# Patient Record
Sex: MALE | Race: WHITE
[De-identification: names, ages, dates, MRNs, and addresses within clinical notes are randomized per-mention and may not be internally consistent; named-entity substitution may affect disease eponyms.]

---

## 2020-02-14 ENCOUNTER — HOSPITAL ENCOUNTER (EMERGENCY)
Dept: HOSPITAL 63 - ER | Age: 45
Discharge: HOME | End: 2020-02-14
Payer: SELF-PAY

## 2020-02-14 VITALS — WEIGHT: 193.35 LBS | HEIGHT: 72 IN | BODY MASS INDEX: 26.19 KG/M2

## 2020-02-14 VITALS — SYSTOLIC BLOOD PRESSURE: 142 MMHG | DIASTOLIC BLOOD PRESSURE: 80 MMHG

## 2020-02-14 DIAGNOSIS — L03.011: Primary | ICD-10-CM

## 2020-02-14 PROCEDURE — 99284 EMERGENCY DEPT VISIT MOD MDM: CPT

## 2020-02-14 PROCEDURE — 73140 X-RAY EXAM OF FINGER(S): CPT

## 2020-02-14 PROCEDURE — 87070 CULTURE OTHR SPECIMN AEROBIC: CPT

## 2020-02-14 NOTE — RAD
FINGER(S) RIGHT

 

History: Index finger infection

 

Comparison: None.

 

Findings:

3 views of the right hand with attention to the second digit are 

submitted. No aggressive bone destruction, acute fracture, or radiopaque 

foreign body is identified. There is soft tissue swelling of the second 

digit.

 

Impression: 

 

1.  No aggressive bone destruction or acute fracture is identified of the 

second digit. 

 

Electronically signed by: Tapan Sanchez MD (2/14/2020 12:57 PM) 

[id number]

## 2020-02-14 NOTE — PHYS DOC
Past History


Past Medical History:  No Pertinent History


Past Surgical History:  Other


Alcohol Use:  None


Drug Use:  None





Adult General


Chief Complaint


Chief Complaint:  FINGER INJURY





HPI


HPI


44-year-old male presents with right index finger pain and swelling. The patient

initially cut the back of that finger 2 weeks ago. He then got a burn on the pad

of the finger several days ago. It swelled up and he popped blister. The last 2 

days, the patient has had a completely swollen right index finger and the 

swelling and redness is radiating into his hand. He has a wound at the center of

his distal pad that is draining for the last 2 days. He noticed it was real and 

fluid so he came to the emergency room for evaluation. It is very painful. The 

patient took one of his friend's hydrocodone as today due to the pain. A reduced

it from an 8 out of 10 to a 3 out of 10. He denies any other injuries.  Denies 

fever or chills.





Review of Systems


Review of Systems





Constitutional: Denies fever or chills []


Eyes: Denies change in visual acuity, redness, or eye pain []


HENT: Denies nasal congestion or sore throat []


Respiratory: Denies cough or shortness of breath []


Cardiovascular: No additional information not addressed in HPI []


GI: Denies abdominal pain, nausea, vomiting, bloody stools or diarrhea []


: Denies dysuria or hematuria []


Musculoskeletal: Denies back pain or joint pain []


Integument: right index finger erythema, pain, swelling[]


Neurologic: Denies headache, focal weakness or sensory changes []


Endocrine: Denies polyuria or polydipsia []





All other systems were reviewed and found to be within normal limits, except as 

documented in this note.





Current Medications


Current Medications





Current Medications








 Medications


  (Trade)  Dose


 Ordered  Sig/Los  Start Time


 Stop Time Status Last Admin


Dose Admin


 


 Naproxen


  (Naprosyn)  500 mg  1X  ONCE  2/14/20 12:30


 2/14/20 12:31 UNV  














Allergies


Allergies





Allergies








Coded Allergies Type Severity Reaction Last Updated Verified


 


  No Known Drug Allergies    11/10/15 No











Physical Exam


Physical Exam





Constitutional: Well developed, well nourished, no acute distress, non-toxic 

appearance. []


HENT: Normocephalic, atraumatic, bilateral external ears normal, oropharynx 

moist, no oral exudates, nose normal. []


Eyes: PERRLA, EOMI, conjunctiva normal, no discharge. [] 


Neck: Normal range of motion, no tenderness, supple, no stridor. [] 


Cardiovascular:Heart rate regular rhythm, no murmur []


Lungs & Thorax:  Bilateral breath sounds clear to auscultation []


Abdomen: Bowel sounds normal, soft, no tenderness, no masses, no pulsatile 

masses. [] 


Skin: Warm, dry, no erythema, no rash. [] 


Back: No tenderness, no CVA tenderness. [] 


Extremities: Right index finger with evidence of partial-thickness burn on the 

pad, central purulent draining wound in the same area, erythema, warmth and 

swelling of the entire digit radiating up into the hand[] 


Neurologic: Alert and oriented X 3, normal motor function, normal sensory 

function, no focal deficits noted. []


Psychologic: Affect normal, judgement normal, mood normal. []





Current Patient Data


Vital Signs





                                   Vital Signs








  Date Time  Temp Pulse Resp B/P (MAP) Pulse Ox O2 Delivery O2 Flow Rate FiO2


 


2/14/20 12:05 98.3 89 16 142/80 (100) 89 Room Air  











EKG


EKG


[]





Radiology/Procedures


Radiology/Procedures


[]


Impressions:


FINGER(S) RIGHT


 


History: Index finger infection


 


Comparison: None.


 


Findings:


3 views of the right hand with attention to the second digit are 


submitted. No aggressive bone destruction, acute fracture, or radiopaque 


foreign body is identified. There is soft tissue swelling of the second 


digit.


 


Impression: 


 


1.  No aggressive bone destruction or acute fracture is identified of the 


second digit. 


 


Electronically signed by: Leandro Bowling MD (2/14/2020 12:57 PM) 


[facility]














DICTATED AND SIGNED BY:     LEANDRO BOWLING MD


DATE:     02/14/20 1257





CC: JOSE LUIS ANDRADE DO; PCP,NO ~





Course & Med Decision Making


Course & Med Decision Making


Pertinent Labs and Imaging studies reviewed. (See chart for details)


The patient appears to have an infection of the finger. No place him on Bactrim 

DS for 7 days. A wound culture was obtained. The fluid was purulent. It is 

spontaneously draining without an I&D. X-ray pending.


[]





Dragon Disclaimer


Dragon Disclaimer


This electronic medical record was generated, in whole or in part, using a voice

 recognition dictation system.





Departure


Departure:


Impression:  


   Primary Impression:  


   Cellulitis of finger of right hand


Disposition:  01 HOME, SELF-CARE


Condition:  STABLE


Referrals:  


PCPTREVER (PCP)


Patient Instructions:  Abscess, Easy-to-Read, Cellulitis, Easy-to-Read


Scripts


Sulfamethoxazole/Trimethoprim (BACTRIM DS TABLET) 1 Each Tablet


1 TAB PO BID for abscess and cellulitis for 7 Days, #14 TAB 0 Refills


   Prov: JOSE LUIS ANDRADE DO         2/14/20 


Hydrocodone Bit/Acetaminophen (NORCO 5-325 TABLET) 1 Each Tablet


1 TAB PO PRN Q6HRS PRN for PAIN, #14 TAB 0 Refills


   Prov: JOSE LUIS ANDRADE DO         2/14/20











JOSE LUIS ANDRADE DO                 Feb 14, 2020 12:37

## 2020-02-23 ENCOUNTER — HOSPITAL ENCOUNTER (INPATIENT)
Dept: HOSPITAL 63 - ER | Age: 45
LOS: 1 days | Discharge: ANOTHER HOSPITAL | DRG: 603 | End: 2020-02-24
Attending: INTERNAL MEDICINE | Admitting: INTERNAL MEDICINE
Payer: SELF-PAY

## 2020-02-23 VITALS — HEIGHT: 72 IN | BODY MASS INDEX: 25.29 KG/M2 | WEIGHT: 186.73 LBS

## 2020-02-23 VITALS — SYSTOLIC BLOOD PRESSURE: 123 MMHG | DIASTOLIC BLOOD PRESSURE: 74 MMHG

## 2020-02-23 VITALS — DIASTOLIC BLOOD PRESSURE: 89 MMHG | SYSTOLIC BLOOD PRESSURE: 144 MMHG

## 2020-02-23 DIAGNOSIS — Z79.899: ICD-10-CM

## 2020-02-23 DIAGNOSIS — L03.113: ICD-10-CM

## 2020-02-23 DIAGNOSIS — Z87.442: ICD-10-CM

## 2020-02-23 DIAGNOSIS — B95.62: ICD-10-CM

## 2020-02-23 DIAGNOSIS — L02.511: Primary | ICD-10-CM

## 2020-02-23 LAB
ALBUMIN SERPL-MCNC: 3.6 G/DL (ref 3.4–5)
ALBUMIN/GLOB SERPL: 0.9 {RATIO} (ref 1–1.7)
ALP SERPL-CCNC: 111 U/L (ref 46–116)
ALT SERPL-CCNC: 33 U/L (ref 16–63)
ANION GAP SERPL CALC-SCNC: 9 MMOL/L (ref 6–14)
AST SERPL-CCNC: 15 U/L (ref 15–37)
BASOPHILS # BLD AUTO: 0.1 X10^3/UL (ref 0–0.2)
BASOPHILS NFR BLD: 1 % (ref 0–3)
BILIRUB SERPL-MCNC: 0.1 MG/DL (ref 0.2–1)
BUN/CREAT SERPL: 14 (ref 6–20)
CA-I SERPL ISE-MCNC: 15 MG/DL (ref 8–26)
CALCIUM SERPL-MCNC: 8.9 MG/DL (ref 8.5–10.1)
CHLORIDE SERPL-SCNC: 105 MMOL/L (ref 98–107)
CO2 SERPL-SCNC: 29 MMOL/L (ref 21–32)
CREAT SERPL-MCNC: 1.1 MG/DL (ref 0.7–1.3)
EOSINOPHIL NFR BLD: 0.2 X10^3/UL (ref 0–0.7)
EOSINOPHIL NFR BLD: 4 % (ref 0–3)
ERYTHROCYTE [DISTWIDTH] IN BLOOD BY AUTOMATED COUNT: 13.2 % (ref 11.5–14.5)
GFR SERPLBLD BASED ON 1.73 SQ M-ARVRAT: 72.1 ML/MIN
GLOBULIN SER-MCNC: 3.8 G/DL (ref 2.2–3.8)
GLUCOSE SERPL-MCNC: 104 MG/DL (ref 70–99)
HCT VFR BLD CALC: 38.8 % (ref 39–53)
HGB BLD-MCNC: 13.2 G/DL (ref 13–17.5)
LYMPHOCYTES # BLD: 2.1 X10^3/UL (ref 1–4.8)
LYMPHOCYTES NFR BLD AUTO: 31 % (ref 24–48)
MCH RBC QN AUTO: 31 PG (ref 25–35)
MCHC RBC AUTO-ENTMCNC: 34 G/DL (ref 31–37)
MCV RBC AUTO: 92 FL (ref 79–100)
MONO #: 0.6 X10^3/UL (ref 0–1.1)
MONOCYTES NFR BLD: 10 % (ref 0–9)
NEUT #: 3.7 X10^3UL (ref 1.8–7.7)
NEUTROPHILS NFR BLD AUTO: 56 % (ref 31–73)
PLATELET # BLD AUTO: 247 X10^3/UL (ref 140–400)
POTASSIUM SERPL-SCNC: 4 MMOL/L (ref 3.5–5.1)
PROT SERPL-MCNC: 7.4 G/DL (ref 6.4–8.2)
RBC # BLD AUTO: 4.25 X10^6/UL (ref 4.3–5.7)
SODIUM SERPL-SCNC: 143 MMOL/L (ref 136–145)
WBC # BLD AUTO: 6.7 X10^3/UL (ref 4–11)

## 2020-02-23 PROCEDURE — 87040 BLOOD CULTURE FOR BACTERIA: CPT

## 2020-02-23 PROCEDURE — 87070 CULTURE OTHR SPECIMN AEROBIC: CPT

## 2020-02-23 PROCEDURE — 80053 COMPREHEN METABOLIC PANEL: CPT

## 2020-02-23 PROCEDURE — 73120 X-RAY EXAM OF HAND: CPT

## 2020-02-23 PROCEDURE — 36415 COLL VENOUS BLD VENIPUNCTURE: CPT

## 2020-02-23 PROCEDURE — 83605 ASSAY OF LACTIC ACID: CPT

## 2020-02-23 PROCEDURE — 85025 COMPLETE CBC W/AUTO DIFF WBC: CPT

## 2020-02-23 RX ADMIN — MORPHINE SULFATE PRN MG: 2 INJECTION, SOLUTION INTRAMUSCULAR; INTRAVENOUS at 21:31

## 2020-02-23 NOTE — PHYS DOC
Past History


Past Medical History:  No Pertinent History


Past Surgical History:  Other


Alcohol Use:  None


Drug Use:  None





Adult General


Chief Complaint


Chief Complaint:  CELLULITIS





HPI


HPI


44-year-old male returns to the emergency room with continued concern of 

infection of his right index finger. I saw the patient in this emergency room 

several days ago. I diagnosed him with cellulitis, likely MRSA. He was placed on

Bactrim. The patient states that the finger seemed to be improving and the 

swelling was decreasing. The redness had and a decrease when he hit his finger 

on a doorway 2 days ago. The finger swelled right back up. It is now red and hot

again. It is oozing pus from the wound once again. He tells me that he took of 

his antibiotics as prescribed. He denies any new penetrating wounds or scratches

to the finger. Denies fever or chills.





Review of Systems


Review of Systems





Constitutional: Denies fever or chills []


Eyes: Denies change in visual acuity, redness, or eye pain []


HENT: Denies nasal congestion or sore throat []


Respiratory: Denies cough or shortness of breath []


Cardiovascular: No additional information not addressed in HPI []


GI: Denies abdominal pain, nausea, vomiting, bloody stools or diarrhea []


: Denies dysuria or hematuria []


Musculoskeletal: Denies back pain or joint pain []


Integument: Cellulitis and abscess right index finger[]


Neurologic: Denies headache, focal weakness or sensory changes []


Endocrine: Denies polyuria or polydipsia []





All other systems were reviewed and found to be within normal limits, except as 

documented in this note.





Allergies


Allergies





Allergies








Coded Allergies Type Severity Reaction Last Updated Verified


 


  I S O L A T I O N *CONTACT* Allergy Unknown  2/19/20 Yes


 


  NKMA Allergy Unknown  2/19/20 Yes











Physical Exam


Physical Exam





Constitutional: Well developed, well nourished, no acute distress, non-toxic 

appearance. []


HENT: Normocephalic, atraumatic, bilateral external ears normal, oropharynx luiz

st, no oral exudates, nose normal. []


Eyes: PERRLA, EOMI, conjunctiva normal, no discharge. [] 


Neck: Normal range of motion, no tenderness, supple, no stridor. [] 


Cardiovascular:Heart rate regular rhythm, no murmur []


Lungs & Thorax:  Bilateral breath sounds clear to auscultation []


Abdomen: Bowel sounds normal, soft, no tenderness, no masses, no pulsatile 

masses. [] 


Skin: Right index finger swollen, red, hot with purulent discharge from wound on

 the pad of the distal phalanx.[] 


Back: No tenderness, no CVA tenderness. [] 


Extremities: No tenderness, no cyanosis, no clubbing, ROM intact, no edema. [] 


Neurologic: Alert and oriented X 3, normal motor function, normal sensory 

function, no focal deficits noted. []


Psychologic: Affect normal, judgement normal, mood normal. []





EKG


EKG


[]





Radiology/Procedures


Radiology/Procedures


[]





Course & Med Decision Making


Course & Med Decision Making


Pertinent Labs and Imaging studies reviewed. (See chart for details)


The patient appears to be failing outpatient oral antibiotics. I looked up his 

previous wound culture from when I saw him last and it is MRSA and it should've 

been susceptible to the Bactrim that I prescribed. A new culture has been 

obtained at this visit. I will admit him to the hospital for IV antibiotics. I 

will discuss this with the hospitalist. His wound already has spontaneous 

drainage, so I will not do an additional incision and drainage. Discussed the 

patient with Dr. Ingram and he has accepted him for admission. We'll place him on 

vancomycin.


[]





Dragon Disclaimer


Dragon Disclaimer


This electronic medical record was generated, in whole or in part, using a voice

 recognition dictation system.





Departure


Departure:


Impression:  


   Primary Impression:  


   Abscess of right index finger


Disposition:  09 ADMITTED AS INPATIENT


Admitting Physician:  John Ingram


Condition:  STABLE


Referrals:  


PCP,TREVER (PCP)











JOSE LUIS ANDRADE DO                 Feb 23, 2020 18:16

## 2020-02-24 VITALS — DIASTOLIC BLOOD PRESSURE: 62 MMHG | SYSTOLIC BLOOD PRESSURE: 96 MMHG

## 2020-02-24 VITALS — SYSTOLIC BLOOD PRESSURE: 121 MMHG | DIASTOLIC BLOOD PRESSURE: 80 MMHG

## 2020-02-24 VITALS — DIASTOLIC BLOOD PRESSURE: 71 MMHG | SYSTOLIC BLOOD PRESSURE: 116 MMHG

## 2020-02-24 VITALS — DIASTOLIC BLOOD PRESSURE: 77 MMHG | SYSTOLIC BLOOD PRESSURE: 124 MMHG

## 2020-02-24 VITALS — SYSTOLIC BLOOD PRESSURE: 153 MMHG | DIASTOLIC BLOOD PRESSURE: 85 MMHG

## 2020-02-24 RX ADMIN — MORPHINE SULFATE PRN MG: 2 INJECTION, SOLUTION INTRAMUSCULAR; INTRAVENOUS at 08:09

## 2020-02-24 RX ADMIN — MORPHINE SULFATE PRN MG: 2 INJECTION, SOLUTION INTRAMUSCULAR; INTRAVENOUS at 14:22

## 2020-02-24 NOTE — RAD
PROCEDURE: HAND RIGHT 2V

 

STUDY DATE: 2/24/2020

 

CLINICAL INDICATION / HISTORY: Infected right index finger.

 

TECHNIQUE: PA, lateral and oblique views of the right hand.

 

COMPARISON: Right index finger of 2/14/2020

 

FINDINGS: 

 

The patient's distal phalanx has developed interval bony erosive changes 

at the volar aspect with generalized demineralization. There is more soft 

tissue swelling in the digits as well. No abnormal soft tissue gas. The 

rest of the right hand is unremarkable.

 

IMPRESSION: 

 

Interval development of findings of osteomyelitis in the distal phalanx of

the right index finger with diffuse soft tissue swelling.

 

At my request, the x-ray technologist Theodora has agreed to call the 

referring physician about these findings on my behalf.

 

Electronically signed by: Sven Oneal MD (2/24/2020 3:27 PM) 

[facility]

## 2020-02-24 NOTE — PN
DATE:  02/24/2020



SUBJECTIVE:  The patient is a 44-year-old  male patient who was seen on

02/14/2020 to the Emergency Room with cellulitis of his right hand that was

treated with Bactrim and apparently the swelling and redness has improved;

however, he apparently hit his finger in a doorway 2 days ago and the finger

swelled back again, now red and hot again.  It is oozing pus from the wound and

the culture that was taken on 02/14/2020, has grown methicillin-resistant

Staphylococcus aureus infection sensitive to Bactrim and therefore, the patient

was admitted and was started on vancomycin.  The patient denied any fever.  Did

complain of pain.  He was started on vancomycin IV and did consult the

pharmacist to adjust the dose.  I ordered also x-ray of his right index finger 

to make sure that he has no osteomyelitis.  I also ordered lab work again

tomorrow including sed rate and CRP.



OBJECTIVE:

GENERAL:  When I saw him today, he looked well and was clearly in no apparent

respiratory distress.

VITAL SIGNS:  Her heart rate was 60, blood pressure was 96/62, temperature 98.4,

respiratory rate was 16, and oxygen saturation was 92%.  The rest of the exam is

stable.

EXTREMITIES:  His index finger continued to be red, swollen and still oozing

some pus from the palmar aspect.



PLAN:  To continue with IV vancomycin as dosed by the pharmacist.  I will await

the result of the x-ray of the right hand and decide on further management

accordingly.





______________________________

EVANGELINA HIGGINS MD



DR:  NIKOLAS/veena  JOB#:  [id number] / [id number]

DD:  02/24/2020 13:06  DT:  02/24/2020 20:37

## 2020-02-24 NOTE — HP
ADMIT DATE:  02/23/2020



HISTORY OF PRESENT ILLNESS:  The patient is a 44-year-old  male patient

who returned to the Emergency Room with continued concern for infection of his

right index finger.  He was seen at [facility] Emergency Room,

diagnosed with cellulitis, likely MRSA.  He was placed on Bactrim.  The patient

stated that his fingers seem to be improving and the swelling was decreasing. 

The redness had decreased, and when he hit his finger on the doorway 2 days ago

the finger swelled right back up, it is now red and hot again, it is oozing pus

from the wound once again.  He tells me that he took of his antibiotic as

prescribed.  He denied any new penetrating wounds or scratches to the finger. 

Denies any fever or chills.  He was evaluated in the Emergency Room and

apparently when he was seen on 02/14, they took a culture, which showed growth

of methicillin-resistant Staphylococcus aureus and although it is sensitive to

Bactrim.  Therefore, he was admitted to be treated with IV vancomycin.



PAST MEDICAL HISTORY:  Significant for nephrolithiasis.



PAST SURGICAL HISTORY:  Significant for appendectomy and hernia repair.



ALLERGIES:  He has no known drug allergies.



MEDICATIONS:  He is currently on no medication.



FAMILY HISTORY:  He has one sister, younger, seems to be healthy and his parents

are still alive in their 60s, but he does not know whether they have any medical

problem.



SOCIAL HISTORY:  He is engaged, has 4 children from 3 different ladies according

to him.  He does not smoke and does not drink alcohol or use recreational drugs.

 He worked as a  at [employer] in [place].



REVIEW OF SYSTEMS:  The patient denies any blurring of vision, cataract,

glaucoma or macular degeneration.  Denied any earache, tinnitus or sensorineural

deafness.  Denied nosebleeds, stuffy nose or postnasal drip.  Denied any sore

throat, sore tongue, toothache, hoarseness of voice or difficulty swallowing. 

Denied any nausea, vomiting, diarrhea or constipation.  Denied any chills,

rigors or fever.  His complaint is mainly ____ which is throbbing.



PHYSICAL EXAMINATION:

GENERAL:  On arrival to the Emergency Room, he looked well and was clearly in no

apparent respiratory distress.  No pallor, jaundice, cyanosis or thyromegaly. 

No jugular venous distention.  No lower limb edema.

VITAL SIGNS:  Her heart rate was 89, blood pressure was 142/80, temperature was

98.3, respiratory rate was 16, and oxygen saturation was 89% on room air.

HEAD, EYES, EARS, NOSE AND THROAT:  Showed normocephalic, atraumatic.

NECK:  Supple.

CARDIAC:  Normal first and second heart sounds.  No gallop or murmur.

CHEST:  Clear to auscultation.  No crepitation or rhonchi.

ABDOMEN:  Distended, soft, nontender.

NEUROLOGIC:  He was awake, alert, responding appropriately.  All cranial nerves

intact.

EXTREMITIES:  He moves extremities without difficulty.  He ambulates without

assistance or assistive devices.



LABORATORY DATA:  His lab work on arrival to the Emergency Room showed a white

cell count 6700, hemoglobin 13, hematocrit 39, MCV 92, and platelet count

247,000.  Serum sodium was 143, potassium 4, chloride 105, bicarbonate 29, anion

gap of 9, BUN 15, creatinine 1.1, estimated GFR was 72 mL per minute.  His

glucose was 104, lactic acid was 1.4, calcium was 8.9.  Total bilirubin, AST,

ALT, alkaline phosphatase were normal.  Total protein 7.4, albumin was 3.6.  His

culture of the abscesses, right index finger showed a growth of

methicillin-resistant Staphylococcus aureus sensitive to clindamycin,

gentamicin, linezolid, rifampin, tetracycline, trimethoprim sulfamethoxazole and

vancomycin.  The patient was admitted and was started on vancomycin.  He will

continue with the pain medication.  I will arrange for him to have an x-ray of

his right index finger and if it did show any bone destruction, I will probably

transfer him to [facility].





______________________________

EVANGELINA HIGGINS MD



DR:  NIKOLAS/veena  JOB#:  [id number] / [id number]

DD:  02/24/2020 12:56  DT:  02/24/2020 13:16

## 2020-03-08 ENCOUNTER — HOSPITAL ENCOUNTER (INPATIENT)
Dept: HOSPITAL 63 - ICU | Age: 45
LOS: 1 days | Discharge: HOME | DRG: 540 | End: 2020-03-09
Attending: INTERNAL MEDICINE | Admitting: INTERNAL MEDICINE
Payer: SELF-PAY

## 2020-03-08 VITALS — DIASTOLIC BLOOD PRESSURE: 70 MMHG | SYSTOLIC BLOOD PRESSURE: 133 MMHG

## 2020-03-08 VITALS — DIASTOLIC BLOOD PRESSURE: 72 MMHG | SYSTOLIC BLOOD PRESSURE: 127 MMHG

## 2020-03-08 VITALS — BODY MASS INDEX: 25.98 KG/M2 | WEIGHT: 191.8 LBS | HEIGHT: 72 IN

## 2020-03-08 DIAGNOSIS — M86.8X4: Primary | ICD-10-CM

## 2020-03-08 DIAGNOSIS — B95.62: ICD-10-CM

## 2020-03-08 DIAGNOSIS — Z87.442: ICD-10-CM

## 2020-03-08 DIAGNOSIS — Z79.01: ICD-10-CM

## 2020-03-08 DIAGNOSIS — L02.519: ICD-10-CM

## 2020-03-08 DIAGNOSIS — I82.B12: ICD-10-CM

## 2020-03-08 LAB
ALBUMIN SERPL-MCNC: 3.4 G/DL (ref 3.4–5)
ALBUMIN/GLOB SERPL: 0.8 {RATIO} (ref 1–1.7)
ALP SERPL-CCNC: 100 U/L (ref 46–116)
ALT SERPL-CCNC: 32 U/L (ref 16–63)
ANION GAP SERPL CALC-SCNC: 9 MMOL/L (ref 6–14)
AST SERPL-CCNC: 16 U/L (ref 15–37)
BASOPHILS # BLD AUTO: 0 X10^3/UL (ref 0–0.2)
BASOPHILS NFR BLD: 1 % (ref 0–3)
BILIRUB SERPL-MCNC: 0.5 MG/DL (ref 0.2–1)
BUN/CREAT SERPL: 12 (ref 6–20)
CA-I SERPL ISE-MCNC: 12 MG/DL (ref 8–26)
CALCIUM SERPL-MCNC: 9.2 MG/DL (ref 8.5–10.1)
CHLORIDE SERPL-SCNC: 101 MMOL/L (ref 98–107)
CO2 SERPL-SCNC: 29 MMOL/L (ref 21–32)
CREAT SERPL-MCNC: 1 MG/DL (ref 0.7–1.3)
EOSINOPHIL NFR BLD: 0.1 X10^3/UL (ref 0–0.7)
EOSINOPHIL NFR BLD: 1 % (ref 0–3)
ERYTHROCYTE [DISTWIDTH] IN BLOOD BY AUTOMATED COUNT: 12.7 % (ref 11.5–14.5)
GFR SERPLBLD BASED ON 1.73 SQ M-ARVRAT: 80.4 ML/MIN
GLOBULIN SER-MCNC: 4.3 G/DL (ref 2.2–3.8)
GLUCOSE SERPL-MCNC: 90 MG/DL (ref 70–99)
HCT VFR BLD CALC: 40.4 % (ref 39–53)
HGB BLD-MCNC: 13.7 G/DL (ref 13–17.5)
LYMPHOCYTES # BLD: 2 X10^3/UL (ref 1–4.8)
LYMPHOCYTES NFR BLD AUTO: 20 % (ref 24–48)
MCH RBC QN AUTO: 31 PG (ref 25–35)
MCHC RBC AUTO-ENTMCNC: 34 G/DL (ref 31–37)
MCV RBC AUTO: 91 FL (ref 79–100)
MONO #: 1.2 X10^3/UL (ref 0–1.1)
MONOCYTES NFR BLD: 12 % (ref 0–9)
NEUT #: 6.6 X10^3UL (ref 1.8–7.7)
NEUTROPHILS NFR BLD AUTO: 67 % (ref 31–73)
PLATELET # BLD AUTO: 215 X10^3/UL (ref 140–400)
POTASSIUM SERPL-SCNC: 4.1 MMOL/L (ref 3.5–5.1)
PROT SERPL-MCNC: 7.7 G/DL (ref 6.4–8.2)
RBC # BLD AUTO: 4.45 X10^6/UL (ref 4.3–5.7)
SODIUM SERPL-SCNC: 139 MMOL/L (ref 136–145)
WBC # BLD AUTO: 10 X10^3/UL (ref 4–11)

## 2020-03-08 PROCEDURE — 36415 COLL VENOUS BLD VENIPUNCTURE: CPT

## 2020-03-08 PROCEDURE — 82550 ASSAY OF CK (CPK): CPT

## 2020-03-08 PROCEDURE — 85025 COMPLETE CBC W/AUTO DIFF WBC: CPT

## 2020-03-08 PROCEDURE — 80053 COMPREHEN METABOLIC PANEL: CPT

## 2020-03-08 RX ADMIN — MORPHINE SULFATE PRN MG: 4 INJECTION, SOLUTION INTRAMUSCULAR; INTRAVENOUS at 22:43

## 2020-03-08 RX ADMIN — ENOXAPARIN SODIUM SCH MG: 100 INJECTION SUBCUTANEOUS at 21:20

## 2020-03-09 VITALS — SYSTOLIC BLOOD PRESSURE: 105 MMHG | DIASTOLIC BLOOD PRESSURE: 62 MMHG

## 2020-03-09 VITALS — SYSTOLIC BLOOD PRESSURE: 115 MMHG | DIASTOLIC BLOOD PRESSURE: 75 MMHG

## 2020-03-09 VITALS — SYSTOLIC BLOOD PRESSURE: 108 MMHG | DIASTOLIC BLOOD PRESSURE: 62 MMHG

## 2020-03-09 RX ADMIN — ENOXAPARIN SODIUM SCH MG: 100 INJECTION SUBCUTANEOUS at 09:00

## 2020-03-09 RX ADMIN — MORPHINE SULFATE PRN MG: 4 INJECTION, SOLUTION INTRAMUSCULAR; INTRAVENOUS at 06:45

## 2020-03-09 RX ADMIN — MORPHINE SULFATE PRN MG: 4 INJECTION, SOLUTION INTRAMUSCULAR; INTRAVENOUS at 14:50

## 2020-03-09 RX ADMIN — MORPHINE SULFATE PRN MG: 4 INJECTION, SOLUTION INTRAMUSCULAR; INTRAVENOUS at 02:35

## 2020-03-09 RX ADMIN — MORPHINE SULFATE PRN MG: 4 INJECTION, SOLUTION INTRAMUSCULAR; INTRAVENOUS at 11:00

## 2020-03-09 NOTE — HP
ADMIT DATE:  03/08/2020



HISTORY OF PRESENT ILLNESS:  The patient is a 44-year-old  male

patient, who was diagnosed with right index finger osteomyelitis for which he

underwent incision and drainage of his right index abscess by Dr. Mcpherson, the

orthopedic surgeon and he was seen in consultation by Infectious Disease

specialist.  The procedure was done on 02/27/2020.  He underwent drainage of

finger abscess complicated felon incision along the cortex of the hand or

finger, osteomyelitis and bone abscess.  He also underwent an arthrotomy with

biopsy of interphalangeal joint and he was initially treated with IV vancomycin

and Zosyn.  Eventually, he was seen, a decision was made to change him to

daptomycin, has had a midline placed in 02/29/2020 and with the recommendation

that the patient should continue antibiotic for 4 weeks as he grew

methicillin-resistant Staphylococcus aureus on the cultures obtained at [facility] with a plan to follow the Infectious Disease specialist.  He was

coming to [facility] to get his antibiotic on daily basis and also to

do the daily dressing changes and he apparently complained of pain in his left

upper extremity and swelling and therefore, he underwent venous Doppler

ultrasound, which basically showed that he has occlusive thrombus identified

involving the left subclavian, left axillary and left brachial vein.  Thrombus

is also evident involving left basilic and cephalic veins.  The left internal

jugular vein is patent.  Flow is seen in the left radial and ulnar veins, and

therefore, the patient was admitted, was started on Lovenox 1 mg/kg

subcutaneously twice a day with the plan is obviously to change the midline or

PICC line and start him on oral Eliquis.



PAST MEDICAL HISTORY:  Significant for nephrolithiasis.



PAST SURGICAL HISTORY:  Significant for appendectomy, hernia repair and most

recently incision and drainage of the left index finger abscess.



ALLERGIES:  He has no known drug allergies.



MEDICATIONS:  He is on daptomycin 500 mg IV daily.  He is also on oxycodone/APAP

5/325 one tablet every 6 hours and OxyContin 10 mg twice a day.



FAMILY HISTORY:  He has one sister, younger seems to be healthy and his parents

are still alive in their 60s, but he does not know whether they have any medical

problem.



SOCIAL HISTORY:  He is engaged, has 4 children from 2 different ladies according

to him.  He does not smoke, does not drink alcohol or use recreational drugs. 

He is a  at [employer].



REVIEW OF SYSTEMS:  As per history of present illness.



PHYSICAL EXAMINATION:

GENERAL:  On arrival to the hospital yesterday, he looked well and was clearly

in no apparent respiratory distress.  No pallor, jaundice, cyanosis or

thyromegaly.  No jugular venous distention.  No limb edema.

VITAL SIGNS:  His heart rate was 80, blood pressure was 127/72, temperature was

98.9, respiratory rate was 20, and oxygen saturation was 97%.

HEAD, EYES, EARS, NOSE AND THROAT:  Normocephalic, atraumatic.

NECK:  Supple.

HEART:  Showed normal first and second heart sounds.  No gallop, rub or murmur.

CHEST:  Clear to auscultation.  No crepitation or rhonchi.

ABDOMEN:  Distended, soft, nontender.

NEUROLOGIC:  He is awake, alert, responding appropriately.  All cranial nerves

are intact.

EXTREMITIES:  He moves extremities without difficulty.   His left upper

extremity is definitely swollen compared to the right and has a midline on the

left brachial vein.  His right index finger is covered with dressing.



LABORATORY DATA:  On admission showed a white cell count of 10,000, hemoglobin

13.7, hematocrit 40, MCV 91, and platelet count of 215,000.  His chemistry

showed a serum sodium 139, potassium 4.1, chloride 101, bicarbonate 29, anion

gap of 9, BUN 12, creatinine 1, estimated GFR was 80 mL per minute, his glucose

was 90, calcium was 9.2.  Total bilirubin, AST, ALT, alkaline phosphatase were

normal.  Total protein was 7.7, albumin was 3.4.



ASSESSMENT AND PLAN:  In summary, this is a 44-year-old  male patient

with infected left index finger with osteomyelitis from which he grew

methicillin-resistant Staphylococcus aureus.  He underwent incision and drainage

on 02/27/2020.  He has a midline placed on 02/29/2020 and has been on daptomycin

since then.  Unfortunately, he developed pain in his left upper extremity and

swelling, which an ultrasound revealed occlusive thrombus involving the left

subclavian, left axillary and left brachial vein.  Plan is obviously to

discontinue the midline and place a PICC line and continue switching to Lovenox.

 I switched him to Eliquis as he will need the antibiotic for a total of 4

weeks' time starting from 02/29/2020.





______________________________

EVANGELINA HIGGINS MD



DR:  Meli  JOB#:  [id number] / [id number]

DD:  03/09/2020 11:04  DT:  03/09/2020 12:04

## 2020-03-09 NOTE — DS
DATE OF DISCHARGE:  03/09/2020



HOSPITAL COURSE:  The patient is a 44-year-old  male patient who is

treated for osteomyelitis of his right index finger.  He was treated initially

with IV vancomycin and Bactrim.  Zyvox was added after that and then decision

was made to discharge him home to continue on IV daptomycin daily, had a midline

placed in his left upper extremity and he came yesterday complaining of pain and

swelling of his left upper extremity.  Doppler ultrasound showed that he has an

occlusive deep venous thrombosis involving the left subclavian, axillary and

brachial veins.  Basilic and cephalic venous thrombus involvement also seen and

I have discussed the possibility of switching him to oral antibiotic as the

wound is healing and his sutures are almost ready to be removed and decision was

made to switch him to oral Zyvox 600 mg twice a day for 3 weeks and then start

him also on Eliquis 10 mg twice a day for 7 days and then 5 mg twice a day for

at least the next 3 months.  He should also continue on his OxyContin and

oxycodone.



PHYSICAL EXAMINATION:

GENERAL:  When I saw him this afternoon, he looked well and was clearly in no

apparent respiratory distress.  No pallor, jaundice, cyanosis or thyromegaly. 

No jugular venous distention.  No limb edema.

VITAL SIGNS:  His heart rate was 80, blood pressure was 105/62, temperature was

98.4, respiratory rate was 18 and his oxygen saturation was 97%. 

Rest of clinical exam is stable, has not really changed.



ASSESSMENT AND PLAN:  Infected right index finger, status post incision and

drainage with growth of methicillin-resistant Staphylococcus aureus sensitive to

Zyvox.  Plan is to discontinue the midline and peripheral IV line.  We will

start him on Zyvox 600 mg twice a day for 3 weeks as well as Eliquis 10 mg twice

a day for 7 days and then 5 mg twice a day for at least 3 months.  Continue with

his OxyContin and oxycodone for pain management.





______________________________

EVANGELINA HIGGINS MD



DR:  NIKOLAS/veena  JOB#:  [id number] / [id number]

DD:  03/09/2020 15:06  DT:  03/09/2020 15:34